# Patient Record
Sex: FEMALE | Race: WHITE | NOT HISPANIC OR LATINO | Employment: FULL TIME | URBAN - METROPOLITAN AREA
[De-identification: names, ages, dates, MRNs, and addresses within clinical notes are randomized per-mention and may not be internally consistent; named-entity substitution may affect disease eponyms.]

---

## 2018-04-24 ENCOUNTER — OFFICE VISIT (OUTPATIENT)
Dept: UROLOGY | Facility: CLINIC | Age: 33
End: 2018-04-24
Payer: COMMERCIAL

## 2018-04-24 ENCOUNTER — HOSPITAL ENCOUNTER (OUTPATIENT)
Dept: RADIOLOGY | Facility: HOSPITAL | Age: 33
Discharge: HOME | End: 2018-04-24
Attending: STUDENT IN AN ORGANIZED HEALTH CARE EDUCATION/TRAINING PROGRAM
Payer: COMMERCIAL

## 2018-04-24 VITALS — SYSTOLIC BLOOD PRESSURE: 128 MMHG | DIASTOLIC BLOOD PRESSURE: 92 MMHG | HEIGHT: 65 IN | HEART RATE: 87 BPM

## 2018-04-24 DIAGNOSIS — R10.9 FLANK PAIN: ICD-10-CM

## 2018-04-24 DIAGNOSIS — N39.0 URINARY TRACT INFECTION WITHOUT HEMATURIA, SITE UNSPECIFIED: ICD-10-CM

## 2018-04-24 DIAGNOSIS — N20.0 NEPHROLITHIASIS: Primary | ICD-10-CM

## 2018-04-24 LAB
BILIRUB SERPL-MCNC: ABNORMAL MG/DL
BLOOD URINE, POC: 250
COLOR, POC UA: YELLOW
GLUCOSE UR QL STRIP: ABNORMAL
KETONES UR QL STRIP: ABNORMAL
LEUKOCYTE ESTERASE URINE, POC: ABNORMAL
NITRITE, POC UA: ABNORMAL
PH, POC UA: 7
PROTEIN, POC: ABNORMAL
SPECIFIC GRAVITY, POC UA: 1
UROBILINOGEN, POC UA: ABNORMAL

## 2018-04-24 PROCEDURE — 81002 URINALYSIS NONAUTO W/O SCOPE: CPT | Mod: S$GLB,,, | Performed by: STUDENT IN AN ORGANIZED HEALTH CARE EDUCATION/TRAINING PROGRAM

## 2018-04-24 PROCEDURE — 99999 PR PBB SHADOW E&M-EST. PATIENT-LVL III: CPT | Mod: PBBFAC,,, | Performed by: STUDENT IN AN ORGANIZED HEALTH CARE EDUCATION/TRAINING PROGRAM

## 2018-04-24 PROCEDURE — 74176 CT ABD & PELVIS W/O CONTRAST: CPT | Mod: 26,,, | Performed by: RADIOLOGY

## 2018-04-24 PROCEDURE — 74176 CT ABD & PELVIS W/O CONTRAST: CPT | Mod: TC

## 2018-04-24 PROCEDURE — 99204 OFFICE O/P NEW MOD 45 MIN: CPT | Mod: 25,S$GLB,, | Performed by: STUDENT IN AN ORGANIZED HEALTH CARE EDUCATION/TRAINING PROGRAM

## 2018-04-24 RX ORDER — LOSARTAN POTASSIUM 25 MG/1
25 TABLET ORAL DAILY
COMMUNITY

## 2018-04-24 RX ORDER — TAMSULOSIN HYDROCHLORIDE 0.4 MG/1
0.4 CAPSULE ORAL DAILY
Qty: 30 CAPSULE | Refills: 0 | Status: SHIPPED | OUTPATIENT
Start: 2018-04-24 | End: 2019-04-24

## 2018-04-24 RX ORDER — KETOROLAC TROMETHAMINE 10 MG/1
10 TABLET, FILM COATED ORAL EVERY 6 HOURS PRN
Qty: 25 TABLET | Refills: 0 | Status: SHIPPED | OUTPATIENT
Start: 2018-04-24

## 2018-04-24 RX ORDER — ONDANSETRON 4 MG/1
4 TABLET, ORALLY DISINTEGRATING ORAL EVERY 6 HOURS PRN
Qty: 30 TABLET | Refills: 0 | Status: SHIPPED | OUTPATIENT
Start: 2018-04-24

## 2018-04-24 RX ORDER — LORATADINE 10 MG/1
10 TABLET ORAL DAILY
COMMUNITY

## 2018-04-24 RX ORDER — ROSUVASTATIN CALCIUM 5 MG/1
5 TABLET, COATED ORAL DAILY
COMMUNITY

## 2018-04-24 NOTE — PROGRESS NOTES
"Subjective:       Patient ID: Maria Isabel Willis is a 32 y.o. female.    Chief Complaint: kidney stones  This is a 32 y.o.  female patient that is new to me.  The patient is self referred to me for kidney stone concerns.  She experienced left flank pain about 2 weeks ago. Friday she experienced a 102F and was diagnosed with a "raging" UTI and was treated with Macrobid this past Friday. No urine culture was sent off. She states that she underwent an imaging and was told that there was a kidney stone that was in the proximal ureter. She does not have access to those images. She states currently does not experience severe pain, she states she has noticed occasional cramping throughout the day. She states that she has been hydrating as much as possible. +naseous, +vomiting. She has been straining at home.   She has never had kidney stones before.     Hydration - she states that she was recently ill but was not hydrating adequately even before her illness.   She works as a  at the hospital in [place].    Lab Results   Component Value Date    CREATININE 0.9 04/12/2013       I personally reviewed the images: none to review    ---  Past Medical History:   Diagnosis Date    History of cholecystectomy 08/2007       Past Surgical History:   Procedure Laterality Date    CHOLECYSTECTOMY  08/2004    MRSA  August 2007    pt is s/p I&D    TONSILLECTOMY         Family History   Problem Relation Age of Onset    Lupus Mother     Heart disease Mother     Diabetes type I Mother     Hypertension Father     Diabetes type II Father     Diabetes type II Brother        Social History   Substance Use Topics    Smoking status: Never Smoker    Smokeless tobacco: Never Used    Alcohol use No       Current Outpatient Prescriptions on File Prior to Visit   Medication Sig Dispense Refill    [DISCONTINUED] cyanocobalamin (VITAMIN B-12) 500 MCG tablet Place 500 mcg under the tongue once daily. Over the Counter Medication  "      [DISCONTINUED] topiramate (TOPAMAX) 50 MG tablet Take by mouth. 1 Tablet Oral Every evening       No current facility-administered medications on file prior to visit.        Review of patient's allergies indicates:   Allergen Reactions    Vancomycin analogues Other (See Comments)     Harjit Syndrome, Shaking - NOT AN ALLERGY    Minocycline Other (See Comments)    Tetracyclines Rash       Review of Systems   Constitutional: Negative for activity change.   HENT: Negative for congestion.    Eyes: Negative for visual disturbance.   Respiratory: Negative for shortness of breath.    Cardiovascular: Negative for chest pain.   Gastrointestinal: Negative for abdominal distention.   Musculoskeletal: Negative for gait problem.   Skin: Negative for color change.   Neurological: Negative for dizziness.   Psychiatric/Behavioral: Negative for agitation.       Objective:      Physical Exam   Constitutional: She is oriented to person, place, and time. She appears well-developed.   HENT:   Head: Normocephalic and atraumatic.   Eyes: EOM are normal.   Neck: Normal range of motion.   Cardiovascular: Intact distal pulses.    Pulmonary/Chest: Effort normal.   Abdominal: Soft. She exhibits no distension. There is tenderness (left flank pain).   Musculoskeletal: Normal range of motion.   Neurological: She is alert and oriented to person, place, and time.   Skin: Skin is warm and dry.   Psychiatric: She has a normal mood and affect.       Assessment:       1. Nephrolithiasis    2. Flank pain    3. Urinary tract infection without hematuria, site unspecified        Plan:         1. CT scan - noncontrasted study to evaluate for location, size, and hardness of kidney/ureteral stone.  2. Will plan for medical expulsive therapy at this time - patient encouraged to hydrate. Prescribed flomax, zofran PRN nausea, and toradol PRN pain.  3. Will send urine for culture and UA. Finished macrobid course last week.       Nephrolithiasis  -      POCT URINE DIPSTICK WITHOUT MICROSCOPE  -     Urinalysis  -     Urinalysis Microscopic  -     CULTURE, URINE    Flank pain  -     CT Renal Stone Study ABD Pelvis WO; Future; Expected date: 04/24/2018  -     Urinalysis  -     Urinalysis Microscopic  -     CULTURE, URINE    Urinary tract infection without hematuria, site unspecified  -     Urinalysis  -     Urinalysis Microscopic  -     CULTURE, URINE    Other orders  -     ondansetron (ZOFRAN-ODT) 4 MG TbDL; Take 1 tablet (4 mg total) by mouth every 6 (six) hours as needed (nausea).  Dispense: 30 tablet; Refill: 0  -     tamsulosin (FLOMAX) 0.4 mg Cp24; Take 1 capsule (0.4 mg total) by mouth once daily.  Dispense: 30 capsule; Refill: 0  -     ketorolac (TORADOL) 10 mg tablet; Take 1 tablet (10 mg total) by mouth every 6 (six) hours as needed for Pain.  Dispense: 25 tablet; Refill: 0

## 2018-04-25 ENCOUNTER — TELEPHONE (OUTPATIENT)
Dept: UROLOGY | Facility: CLINIC | Age: 33
End: 2018-04-25

## 2018-04-25 NOTE — TELEPHONE ENCOUNTER
----- Message from Juan Manuel Garcia sent at 4/25/2018  2:58 PM CDT -----  Contact: Pt  Pt missed a call and would like the nurse to return their call.    Pt can be reached at [phone].    Thanks

## 2018-04-25 NOTE — TELEPHONE ENCOUNTER
Returned call, spoke with patient. Informed Dr Celestin wanted to speak with her specifically.   Asked a good time to call.  Informed before 0900 hours on tomorrow.

## 2018-04-25 NOTE — TELEPHONE ENCOUNTER
----- Message from Tiera Samaniego sent at 4/25/2018  4:19 PM CDT -----  Contact: self, [phone]  Patient called in returning your call. States she has jury duty. Please advise.

## 2018-04-26 ENCOUNTER — PATIENT MESSAGE (OUTPATIENT)
Dept: UROLOGY | Facility: CLINIC | Age: 33
End: 2018-04-26

## 2018-04-26 ENCOUNTER — TELEPHONE (OUTPATIENT)
Dept: UROLOGY | Facility: CLINIC | Age: 33
End: 2018-04-26

## 2018-04-26 DIAGNOSIS — N20.0 NEPHROLITHIASIS: Primary | ICD-10-CM

## 2018-04-26 NOTE — TELEPHONE ENCOUNTER
Able to reach patient, counseled the left pelvic calcification near the uterus could represent phlebolith versus a left mid ureteral calculus. Regardless, the calcification is about 4mm in size and based on the patient's reported signs/symptoms and outside (unofficial) CT scan findings of a left proximal ureteral calculus that I do not have access to, it is possible this is the stone and it has moved more distal. She has a very good chance of passing this stone. Counseled for her to continue to hydrate and strain for the kidney stone.     I counseled her that typically we schedule a CT scan in 4 weeks to document stone passage. We can cancel this if she sees the stone pass and change it to an ultrasound in 4 weeks.

## 2018-04-29 ENCOUNTER — PATIENT MESSAGE (OUTPATIENT)
Dept: UROLOGY | Facility: CLINIC | Age: 33
End: 2018-04-29

## 2018-05-03 ENCOUNTER — TELEPHONE (OUTPATIENT)
Dept: UROLOGY | Facility: CLINIC | Age: 33
End: 2018-05-03

## 2018-05-03 RX ORDER — FLUCONAZOLE 150 MG/1
150 TABLET ORAL ONCE
Qty: 1 TABLET | Refills: 0 | Status: SHIPPED | OUTPATIENT
Start: 2018-05-03 | End: 2018-05-03 | Stop reason: SDUPTHER

## 2018-05-03 RX ORDER — FLUCONAZOLE 150 MG/1
150 TABLET ORAL ONCE
Qty: 1 TABLET | Refills: 0 | Status: SHIPPED | OUTPATIENT
Start: 2018-05-03 | End: 2018-05-03

## 2018-05-03 NOTE — TELEPHONE ENCOUNTER
Called patient about results that we received in the fax:  Urinalysis nitrite and ketone negative, 1 bacteria, 2+ yeast  Urine culture 10-25K CFU mixed skin mona - likely contaminant    I called patient that she does not have a UTI however she may have a yeast infection. She states she is feeling itchy in her vaginal area. Otherwise no additional antibiotics needed as urine culture results were benign.    Will call in fluconazole 150 mg po x 1.  Results will be scanned into media tab.

## 2018-05-08 ENCOUNTER — PATIENT MESSAGE (OUTPATIENT)
Dept: UROLOGY | Facility: CLINIC | Age: 33
End: 2018-05-08

## 2018-05-17 ENCOUNTER — HOSPITAL ENCOUNTER (OUTPATIENT)
Dept: RADIOLOGY | Facility: HOSPITAL | Age: 33
Discharge: HOME | End: 2018-05-17
Attending: STUDENT IN AN ORGANIZED HEALTH CARE EDUCATION/TRAINING PROGRAM
Payer: COMMERCIAL

## 2018-05-17 ENCOUNTER — PATIENT MESSAGE (OUTPATIENT)
Dept: UROLOGY | Facility: CLINIC | Age: 33
End: 2018-05-17

## 2018-05-17 ENCOUNTER — OFFICE VISIT (OUTPATIENT)
Dept: UROLOGY | Facility: CLINIC | Age: 33
End: 2018-05-17
Payer: COMMERCIAL

## 2018-05-17 VITALS
HEIGHT: 65 IN | SYSTOLIC BLOOD PRESSURE: 115 MMHG | TEMPERATURE: 98 F | HEART RATE: 79 BPM | DIASTOLIC BLOOD PRESSURE: 83 MMHG

## 2018-05-17 DIAGNOSIS — N20.0 NEPHROLITHIASIS: ICD-10-CM

## 2018-05-17 DIAGNOSIS — R39.15 URGENCY OF URINATION: ICD-10-CM

## 2018-05-17 DIAGNOSIS — N20.1 URETERAL CALCULUS: Primary | ICD-10-CM

## 2018-05-17 PROCEDURE — 99999 PR PBB SHADOW E&M-EST. PATIENT-LVL III: CPT | Mod: PBBFAC,,, | Performed by: STUDENT IN AN ORGANIZED HEALTH CARE EDUCATION/TRAINING PROGRAM

## 2018-05-17 PROCEDURE — 99213 OFFICE O/P EST LOW 20 MIN: CPT | Mod: S$GLB,,, | Performed by: STUDENT IN AN ORGANIZED HEALTH CARE EDUCATION/TRAINING PROGRAM

## 2018-05-17 PROCEDURE — 74176 CT ABD & PELVIS W/O CONTRAST: CPT | Mod: TC

## 2018-05-17 PROCEDURE — 74176 CT ABD & PELVIS W/O CONTRAST: CPT | Mod: 26,,, | Performed by: RADIOLOGY

## 2018-05-17 NOTE — PROGRESS NOTES
"Subjective:       Patient ID: Maria Isabel Willis is a 32 y.o. female.    Chief Complaint:  Kidney stone followup  This is a 32 y.o.  female patient that is an established patient of mine.   The patient is self referred to me for kidney stone concerns.  She experienced left flank pain about 2 weeks ago. Friday she experienced a 102F and was diagnosed with a "raging" UTI and was treated with Macrobid this past Friday. No urine culture was sent off. She states that she underwent an imaging and was told that there was a kidney stone that was in the proximal ureter. She does not have access to those images. She states currently does not experience severe pain, she states she has noticed occasional cramping throughout the day. She states that she has been hydrating as much as possible. +naseous, +vomiting. She has been straining at home.   She has never had kidney stones before.     Hydration - she states that she was recently ill but was not hydrating adequately even before her illness.   She works as a  at the hospital in [place].Able to reach patient, counseled the left pelvic calcification near the uterus could represent phlebolith versus a left mid ureteral calculus. Regardless, the calcification is about 4mm in size and based on the patient's reported signs/symptoms and outside (unofficial) CT scan findings of a left proximal ureteral calculus that I do not have access to, it is possible this is the stone and it has moved more distal. She has a very good chance of passing this stone. Counseled for her to continue to hydrate and strain for the kidney stone.     She moved her CT and visit up by 1 week because she is coming to take care of her mother. It sounds like her mother has lupus, significant swelling, and she is trying to help out and make sure her mom receives the healthcare attention that she needs.      Lab Results   Component Value Date    CREATININE 0.9 04/12/2013     I personally reviewed " the images and showed the images to the patient-  CT 5/17/18 - very mild left hydroureteronephrosis, down to the level of the UVJ 3mm calcification almost entering the bladder.     Previous CT 4/24/18 - left mild hydroureteronephrosis down to the level of the mid/distal ureter small 3mm calcification possibly consistent with ureteral calculus.       ---  Past Medical History:   Diagnosis Date    History of cholecystectomy 08/2007       Past Surgical History:   Procedure Laterality Date    CHOLECYSTECTOMY  08/2004    MRSA  August 2007    pt is s/p I&D    TONSILLECTOMY         Family History   Problem Relation Age of Onset    Lupus Mother     Heart disease Mother     Diabetes type I Mother     Hypertension Father     Diabetes type II Father     Diabetes type II Brother        Social History   Substance Use Topics    Smoking status: Never Smoker    Smokeless tobacco: Never Used    Alcohol use No       Current Outpatient Prescriptions on File Prior to Visit   Medication Sig Dispense Refill    ketorolac (TORADOL) 10 mg tablet Take 1 tablet (10 mg total) by mouth every 6 (six) hours as needed for Pain. 25 tablet 0    loratadine (CLARITIN) 10 mg tablet Take 10 mg by mouth once daily.      losartan (COZAAR) 25 MG tablet Take 25 mg by mouth once daily.      mv-mn/iron/folic acid/herb 190 (VITAMIN D3 COMPLETE ORAL) Take 1 tablet by mouth once daily.      ondansetron (ZOFRAN-ODT) 4 MG TbDL Take 1 tablet (4 mg total) by mouth every 6 (six) hours as needed (nausea). 30 tablet 0    rosuvastatin (CRESTOR) 5 MG tablet Take 5 mg by mouth once daily.      tamsulosin (FLOMAX) 0.4 mg Cp24 Take 1 capsule (0.4 mg total) by mouth once daily. 30 capsule 0     No current facility-administered medications on file prior to visit.        Review of patient's allergies indicates:   Allergen Reactions    Vancomycin analogues Other (See Comments)     Harjit Syndrome, Shaking - NOT AN ALLERGY    Minocycline Other (See  Comments)    Tetracyclines Rash       Review of Systems   Constitutional: Negative for activity change.   HENT: Negative for congestion.    Eyes: Negative for visual disturbance.   Respiratory: Negative for shortness of breath.    Cardiovascular: Negative for chest pain.   Gastrointestinal: Negative for abdominal distention.   Musculoskeletal: Negative for gait problem.   Skin: Negative for color change.   Neurological: Negative for dizziness.   Psychiatric/Behavioral: Negative for agitation.       Objective:      Physical Exam   Constitutional: She is oriented to person, place, and time. She appears well-developed.   HENT:   Head: Normocephalic and atraumatic.   Eyes: EOM are normal.   Neck: Normal range of motion.   Cardiovascular: Intact distal pulses.    Pulmonary/Chest: Effort normal.   Abdominal: Soft. She exhibits no distension. There is no tenderness.   Musculoskeletal: Normal range of motion.   Neurological: She is alert and oriented to person, place, and time.   Skin: Skin is warm and dry.   Psychiatric: She has a normal mood and affect.       Assessment:       1. Ureteral calculus    2. Urgency of urination        Plan:         1. I showed the patient the CT images from 4/2018 and today. The left ureteral calculus has moved and looks like it is getting very close to the bladder.   2. She will continue to strain her urine, hydrate, and take flomax.  3. If she needs refills she will call our office.        Ureteral calculus    Urgency of urination

## 2018-05-18 NOTE — TELEPHONE ENCOUNTER
I spoke with the patient by telephone today.  She states that she went to [facility] last night because of pain which she was given pain medication and then discharged    She states that her pain is a bit better today however she has not yet passed the stone.    I encouraged her to continue taking the Flomax and increase her fluid intake and strain her urine.    If symptoms continue, she was encouraged to either see me on Monday or Dr. Celestin later next week.    Patient states that if pain becomes worse again, then she will go back to the emergency room.

## 2018-05-18 NOTE — TELEPHONE ENCOUNTER
Since visit with Dr Celestin on yesterday, patient's ct scan has been revised after re-reviewing images and she is now experiencing symptoms she had not previously presented with.  Please review in Dr Celestin's absence.  Thanks

## 2018-05-22 ENCOUNTER — PATIENT MESSAGE (OUTPATIENT)
Dept: UROLOGY | Facility: CLINIC | Age: 33
End: 2018-05-22

## 2018-05-30 ENCOUNTER — PATIENT MESSAGE (OUTPATIENT)
Dept: UROLOGY | Facility: CLINIC | Age: 33
End: 2018-05-30

## 2018-05-31 ENCOUNTER — TELEPHONE (OUTPATIENT)
Dept: UROLOGY | Facility: CLINIC | Age: 33
End: 2018-05-31

## 2018-05-31 DIAGNOSIS — N20.1 URETERAL CALCULUS: Primary | ICD-10-CM

## 2018-05-31 NOTE — PROGRESS NOTES
"Subjective:       Patient ID: Maria Isabel Willis is a 32 y.o. female.    Chief Complaint: No chief complaint on file.   ***  This is a 32 y.o.  female patient that is {Blank single:96287::"new to me.","new to me but not new to the system.","an established patient of mine."}  The patient is {Blank single:78977::"self referred","referred to me by"} *** for ***.     Location:  Quality:  Duration:  Timing:  Severity:  Modifying factors:  Associated signs/symptoms:    LAST PSA  No results found for: PSA, PSADIAG, PSATOTAL, PSAFREE    Lab Results   Component Value Date    CREATININE 0.9 04/12/2013       I personally reviewed the images: ***  No results found in the last 24 hours.      ---  Past Medical History:   Diagnosis Date    History of cholecystectomy 08/2007       Past Surgical History:   Procedure Laterality Date    CHOLECYSTECTOMY  08/2004    MRSA  August 2007    pt is s/p I&D    TONSILLECTOMY         Family History   Problem Relation Age of Onset    Lupus Mother     Heart disease Mother     Diabetes type I Mother     Hypertension Father     Diabetes type II Father     Diabetes type II Brother        Social History   Substance Use Topics    Smoking status: Never Smoker    Smokeless tobacco: Never Used    Alcohol use No       Current Outpatient Prescriptions on File Prior to Visit   Medication Sig Dispense Refill    ketorolac (TORADOL) 10 mg tablet Take 1 tablet (10 mg total) by mouth every 6 (six) hours as needed for Pain. 25 tablet 0    loratadine (CLARITIN) 10 mg tablet Take 10 mg by mouth once daily.      losartan (COZAAR) 25 MG tablet Take 25 mg by mouth once daily.      mv-mn/iron/folic acid/herb 190 (VITAMIN D3 COMPLETE ORAL) Take 1 tablet by mouth once daily.      ondansetron (ZOFRAN-ODT) 4 MG TbDL Take 1 tablet (4 mg total) by mouth every 6 (six) hours as needed (nausea). 30 tablet 0    rosuvastatin (CRESTOR) 5 MG tablet Take 5 mg by mouth once daily.      tamsulosin (FLOMAX) 0.4 mg Cp24 " "Take 1 capsule (0.4 mg total) by mouth once daily. 30 capsule 0     No current facility-administered medications on file prior to visit.        Review of patient's allergies indicates:   Allergen Reactions    Vancomycin analogues Other (See Comments)     Harjit Syndrome, Shaking - NOT AN ALLERGY    Minocycline Other (See Comments)    Tetracyclines Rash       Review of Systems    Objective:      Physical Exam    Assessment:       1. Ureteral calculus        Plan:         Monitoring (signs, sx, disease progression/regression)-    Evaluating (test results, med effectiveness, response to treatment)-    Assessing/addressing (ordering tests, discussion, review records, counseling)-    Treatment (meds, therapies)-      {Blank single:09318::"improved","stable","worse"}      Ureteral calculus  -     Urinary Stone Analysis; Future; Expected date: 05/31/2018      "

## 2018-05-31 NOTE — PROGRESS NOTES
Pt passed stone, placed stone chemical analysis order and faxed to the number she requested in her [employer] message.

## 2018-06-08 ENCOUNTER — PATIENT MESSAGE (OUTPATIENT)
Dept: UROLOGY | Facility: CLINIC | Age: 33
End: 2018-06-08